# Patient Record
Sex: FEMALE | Race: WHITE | Employment: FULL TIME | URBAN - METROPOLITAN AREA
[De-identification: names, ages, dates, MRNs, and addresses within clinical notes are randomized per-mention and may not be internally consistent; named-entity substitution may affect disease eponyms.]

---

## 2019-06-27 ENCOUNTER — HOSPITAL ENCOUNTER (OUTPATIENT)
Dept: CT IMAGING | Age: 64
Discharge: HOME | End: 2019-06-27
Payer: COMMERCIAL

## 2019-06-27 VITALS
WEIGHT: 150 LBS | BODY MASS INDEX: 24.99 KG/M2 | TEMPERATURE: 97.4 F | HEART RATE: 63 BPM | HEIGHT: 65 IN | RESPIRATION RATE: 18 BRPM | OXYGEN SATURATION: 99 % | SYSTOLIC BLOOD PRESSURE: 144 MMHG | DIASTOLIC BLOOD PRESSURE: 79 MMHG

## 2019-06-27 DIAGNOSIS — R79.89 ABNORMAL LFTS: ICD-10-CM

## 2019-06-27 LAB
APTT: 34.3 SEC (ref 26–36)
INR BLD: 1.03 (ref 0.86–1.14)
PLATELET # BLD: 148 K/UL (ref 135–450)
PROTHROMBIN TIME: 11.7 SEC (ref 9.8–13)

## 2019-06-27 PROCEDURE — 2709999900 CT NEEDLE BIOPSY LIVER PERCUTANEOUS

## 2019-06-27 PROCEDURE — 7100000011 HC PHASE II RECOVERY - ADDTL 15 MIN

## 2019-06-27 PROCEDURE — 85730 THROMBOPLASTIN TIME PARTIAL: CPT

## 2019-06-27 PROCEDURE — 36415 COLL VENOUS BLD VENIPUNCTURE: CPT

## 2019-06-27 PROCEDURE — 6360000002 HC RX W HCPCS: Performed by: RADIOLOGY

## 2019-06-27 PROCEDURE — 85610 PROTHROMBIN TIME: CPT

## 2019-06-27 PROCEDURE — 88313 SPECIAL STAINS GROUP 2: CPT

## 2019-06-27 PROCEDURE — 85049 AUTOMATED PLATELET COUNT: CPT

## 2019-06-27 PROCEDURE — 7100000010 HC PHASE II RECOVERY - FIRST 15 MIN

## 2019-06-27 PROCEDURE — 88307 TISSUE EXAM BY PATHOLOGIST: CPT

## 2019-06-27 RX ORDER — MIDAZOLAM HYDROCHLORIDE 1 MG/ML
INJECTION INTRAMUSCULAR; INTRAVENOUS
Status: COMPLETED | OUTPATIENT
Start: 2019-06-27 | End: 2019-06-27

## 2019-06-27 RX ORDER — LOSARTAN POTASSIUM AND HYDROCHLOROTHIAZIDE 12.5; 1 MG/1; MG/1
TABLET ORAL
COMMUNITY
Start: 2019-05-17

## 2019-06-27 RX ORDER — FENTANYL CITRATE 50 UG/ML
INJECTION, SOLUTION INTRAMUSCULAR; INTRAVENOUS
Status: COMPLETED | OUTPATIENT
Start: 2019-06-27 | End: 2019-06-27

## 2019-06-27 RX ADMIN — MIDAZOLAM HYDROCHLORIDE 0.5 MG: 2 INJECTION, SOLUTION INTRAMUSCULAR; INTRAVENOUS at 12:23

## 2019-06-27 RX ADMIN — FENTANYL CITRATE 25 MCG: 50 INJECTION, SOLUTION INTRAMUSCULAR; INTRAVENOUS at 12:23

## 2019-06-27 ASSESSMENT — PAIN SCALES - GENERAL
PAINLEVEL_OUTOF10: 0

## 2019-06-27 NOTE — PROGRESS NOTES
Pt eating well and spouse at bedside. Pt drsg on right  upper abd  Is dry and intact. VS are stable.

## 2019-06-27 NOTE — PROGRESS NOTES
Ambulatory Surgery/Procedure Discharge Note    Vitals:    06/27/19 1440   BP: (!) 144/79   Pulse: 63   Resp: 18   Temp: 97.4 °F (36.3 °C)   SpO2: 99%       In: 100 [P.O.:100]  Out: -  declined offer to urinate    Restroom use offered before discharge. Yes    Pain assessment:  none  Pain Level: 0    VSS and checked again RUQ of abdomen dressing, which is CDI; no hematoma; pt denies any pain; Pt ate boxed lunch; Discharge instructions were already discussed with pt by previous nurse, and pt and  verbalized understanding. IV removed by this nurse, and dressing applied. /79 within 20% of preop BP of 139/66. Patient discharged to home/self care.  Patient discharged via wheel chair by transporter to waiting family/S.O.       6/27/2019 2:57 PM

## 2019-06-27 NOTE — PROGRESS NOTES
Pt and spouse verbalize understanding of dc instructions. Liver bx drsg dry and intact. Vs remain stable.

## 2019-06-27 NOTE — H&P
IR  H & P      Patient:  Lilia Castaneda   :   1955      Relevant patient history reviewed and discussed. CC: Elevated liver function testing in need of liver biopsy    The procedure including risks and benefits was discussed at length with the patient (or designated family member) and all questions were answered. Informed consent to proceed with the procedure was given. Heart : regular rate and rhythm  Lungs : clear, breathing easily  Airway Assessment: Mallampati 2  Condition : stable    Oliverio Scale: Activity:  2 - Able to move 4 extremities voluntarily on command  Respiration:  2 - Able to breathe deeply and cough freely  Circulation:  2 - BP+/- 20mmHg of normal  Consciousness:  2 - Fully awake  Oxygen Saturation (color):  2 - Able to maintain oxygen saturation >92% on room air      [facility] reviewed and agreed. Medications reviewed. Current Outpatient Medications on File Prior to Encounter   Medication Sig Dispense Refill    losartan-hydrochlorothiazide (HYZAAR) 100-12.5 MG per tablet TAKE 1 TABLET DAILY       No current facility-administered medications on file prior to encounter. Allergies:    Allergies   Allergen Reactions    Sulfa Antibiotics Rash     Sedation : Moderate sedation planned  ASA 1 - Normal health patient

## 2019-06-27 NOTE — FLOWSHEET NOTE
Patient arrived alert and orientated x 4, ambulatory, steady gait, breathing easily on room air, denies pain. Spoke to  Prior to procedure. Labs and medications reviewed. Tolerated procedure well, breathing easily on room air. Liver biopsy done, dsd applied, no bleeding at site. Report called to RN and patient transported in stable condition to [facility].   Sedation: Versed:0.5 mg Fentanyl:25 mcg

## 2019-06-27 NOTE — PROGRESS NOTES
Received report from [facility]; pt is alert; speech clear; breathing easily on RA; denies any pain; dressing RUQ below breast is CDI, no hematoma;  at bedside; awaiting discharge at 1500; denies any needs;

## 2021-09-06 LAB
HPV COMMENT: NORMAL
HPV TYPE 16: NOT DETECTED
HPV TYPE 18: NOT DETECTED
HPVOH (OTHER TYPES): NOT DETECTED